# Patient Record
Sex: MALE | Race: WHITE | NOT HISPANIC OR LATINO | Employment: FULL TIME | URBAN - NONMETROPOLITAN AREA
[De-identification: names, ages, dates, MRNs, and addresses within clinical notes are randomized per-mention and may not be internally consistent; named-entity substitution may affect disease eponyms.]

---

## 2018-06-24 ENCOUNTER — OCCUPATIONAL MEDICINE (OUTPATIENT)
Dept: URGENT CARE | Facility: PHYSICIAN GROUP | Age: 58
End: 2018-06-24
Payer: COMMERCIAL

## 2018-06-24 VITALS
OXYGEN SATURATION: 95 % | SYSTOLIC BLOOD PRESSURE: 140 MMHG | HEART RATE: 90 BPM | RESPIRATION RATE: 14 BRPM | DIASTOLIC BLOOD PRESSURE: 90 MMHG | TEMPERATURE: 98.3 F | WEIGHT: 313 LBS

## 2018-06-24 DIAGNOSIS — S83.92XA SPRAIN OF LEFT KNEE, UNSPECIFIED LIGAMENT, INITIAL ENCOUNTER: ICD-10-CM

## 2018-06-24 PROCEDURE — 99214 OFFICE O/P EST MOD 30 MIN: CPT | Performed by: FAMILY MEDICINE

## 2018-06-24 ASSESSMENT — ENCOUNTER SYMPTOMS
FEVER: 0
FOCAL WEAKNESS: 0
DIZZINESS: 0

## 2018-06-24 NOTE — LETTER
EMPLOYEE’S CLAIM FOR COMPENSATION/ REPORT OF INITIAL TREATMENT  FORM C-4    EMPLOYEE’S CLAIM - PROVIDE ALL INFORMATION REQUESTED   First Name  Gildardo Last Name  Kirit Birthdate                    1960                Sex  male Claim Number   Home Address  [address] Age  57 y.o. 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  Zip  [address]    Date of Injury  6/22/2018               Hour of Injury  9:30 PM Date Employer Notified  6/22/2018 Last Day of Work after Injury or Occupational Disease  6/22/2018 Supervisor to Whom Injury Reported  Irina   Address or Location of Accident (if applicable)  [[address]]   What were you doing at the time of accident? (if applicable)  Lifting bucket of rosa sealant    How did this injury or occupational disease occur? (Be specific an answer in detail. Use additional sheet if necessary)  I was Lifting 50lbs buckets (11) of rosa sealant and I felt my left knee pop   If you believe that you have an occupational disease, when did you first have knowledge of the disability and it relationship to your employment?  no Witnesses to the Accident  no      Nature of Injury or Occupational Disease  Workers' Compensation  Part(s) of Body Injured or Affected  Knee (L), ,     I certify that the above is true and correct to the best of my knowledge and that I have provided this information in order to obtain the benefits of [facility] Industrial Insurance and  Occupational Diseases Acts [place] inclusive [place] 617 [place]).  I hereby authorize any physician, chiropractor, surgeon, practitioner, or other person, any hospital, including [facility], any medical service organization, any insurance company, or other institution or organization to release to each other, any medical or other information, including benefits paid or payable, pertinent to this injury or disease, except information relative to diagnosis, treatment and/or counseling for AIDS, psychological conditions, alcohol or controlled substances, for which I must give specific authorization.  A Photostat of this authorization shall be as valid as the original.     Date 06/24/2018   [facility]   Employee’s Signature   THIS REPORT MUST BE COMPLETED AND MAILED WITHIN 3 WORKING DAYS OF TREATMENT   [facility]  Name of Facility  [place]   Date  6/24/2018 Diagnosis  (S83.92XA) Sprain of left knee, unspecified ligament, initial encounter Is there evidence the injured employee was under the influence of alcohol and/or another controlled substance at the time of accident?   Hour  1:51 PM Description of Injury or Disease  The encounter diagnosis was Sprain of left knee, unspecified ligament, initial encounter. No   Treatment  Work restrictions   RICE  OTC Motrin and tylenol   Have you advised the patient to remain off work five days or more? No   X-Ray Findings      If Yes   From Date  To Date      From information given by the employee, together with medical evidence, can you directly connect this injury or occupational disease as job incurred?  Yes If No Full Duty  No Modified Duty  Yes   Is additional medical care by a physician indicated?  Yes If Modified Duty, Specify any Limitations / Restrictions  Sedentary duty mainly. Limit standing/walking to 2hrs max per shift    Do you know of any previous injury or disease contributing  "to this condition or occupational disease?                            No   Date  6/24/2018 Print Doctor’s Name Bhupnedra Flores M.D. I certify the employer’s copy of  this form was mailed on:   Address  [address] Insurer’s Use Only     [place] Zip  [address]    Provider’s Tax ID Number  [id number] Telephone  Dept: [phone]        e-SignBHUPENDRA FLORES M.D.   e-Signature: Dr. Pavan Jackson, Medical Director Degree  MD        ORIGINAL-TREATING PHYSICIAN OR CHIROPRACTOR    PAGE 2-INSURER/TPA    PAGE 3-EMPLOYER    PAGE 4-EMPLOYEE             Form C-4 (rev10/07)              BRIEF DESCRIPTION OF RIGHTS AND BENEFITS  (Pursuant to [place]RS 616C.050)    Notice of Injury or Occupational Disease (Incident Report Form C-1): If an injury or occupational disease (OD) arises out of and in the  course of employment, you must provide written notice to your employer as soon as practicable, but no later than 7 days after the accident or  OD. Your employer shall maintain a sufficient supply of the required forms.    Claim for Compensation (Form C-4): If medical treatment is sought, the form C-4 is available at the place of initial treatment. A completed  \"Claim for Compensation\" (Form C-4) must be filed within 90 days after an accident or OD. The treating physician or chiropractor must,  within 3 working days after treatment, complete and mail to the employer, the employer's insurer and third-party , the Claim for  Compensation.    Medical Treatment: If you require medical treatment for your on-the-job injury or OD, you may be required to select a physician or  chiropractor from a list provided by your workers’ compensation insurer, if it has contracted with an Organization for Managed Care (MCO) or  Preferred Provider Organization (PPO) or providers of health care. If your employer has not entered into a contract with an MCO or PPO, you  may select a physician or chiropractor from the " Panel of Physicians and Chiropractors. Any medical costs related to your industrial injury or  OD will be paid by your insurer.    Temporary Total Disability (TTD): If your doctor has certified that you are unable to work for a period of at least 5 consecutive days, or 5  cumulative days in a 20-day period, or places restrictions on you that your employer does not accommodate, you may be entitled to TTD  compensation.    Temporary Partial Disability (TPD): If the wage you receive upon reemployment is less than the compensation for TTD to which you are  entitled, the insurer may be required to pay you TPD compensation to make up the difference. TPD can only be paid for a maximum of 24  months.    Permanent Partial Disability (PPD): When your medical condition is stable and there is an indication of a PPD as a result of your injury or  OD, within 30 days, your insurer must arrange for an evaluation by a rating physician or chiropractor to determine the degree of your PPD. The  amount of your PPD award depends on the date of injury, the results of the PPD evaluation and your age and wage.    Permanent Total Disability (PTD): If you are medically certified by a treating physician or chiropractor as permanently and totally disabled  and have been granted a PTD status by your insurer, you are entitled to receive monthly benefits not to exceed 66 2/3% of your average  monthly wage. The amount of your PTD payments is subject to reduction if you previously received a PPD award.    Vocational Rehabilitation Services: You may be eligible for vocational rehabilitation services if you are unable to return to the job due to a  permanent physical impairment or permanent restrictions as a result of your injury or occupational disease.    Transportation and Per [place] Reimbursement: You may be eligible for travel expenses and per leola associated with medical treatment.    Reopening: You may be able to reopen your claim if your  condition worsens after claim closure.    Appeal Process: If you disagree with a written determination issued by the insurer or the insurer does not respond to your request, you may  appeal to the Department of Administration, , by following the instructions contained in your determination letter. You must  appeal the determination within 70 days from the date of the determination letter at [address], [address], [place], [place]  [address], or [address], [place], [place] [address]. If you disagree with the  decision, you may appeal to the  Department of Administration, . You must file your appeal within 30 days from the date of the  decision  letter at [address], [address], [place], [place] [address], or [address] [facility] [address], [place], [place] [address]. If you  disagree with a decision of an , you may file a petition for judicial review with the District Court. You must do so within 30  days of the Appeal Officer’s decision. You may be represented by an  at your own expense or you may contact the [facility] for possible  representation.    [place]  for Injured Workers (NAIW): If you disagree with a  decision, you may request that NAIW represent you  without charge at an  Hearing. For information regarding denial of benefits, you may contact the [facility] at: [address], [address], [place] [address], [phone], or [address], [place] [address], [phone]    To File a Complaint with the Division: If you wish to file a complaint with the  of the Division of Industrial Relations (DIR),  please contact the Workers’ Compensation Section, [address], [address], [place], [place] [address], telephone [phone], or  [address], [place], [place] [address], telephone  [phone].    For assistance with Workers’ Compensation Issues: you may contact the Office of the Governor Consumer Health Assistance, [address] [place] [address], [address], [place] [address], Toll Free 1-745.380.9745, Web site: http://[employer].[email or web address].nv., E-mail  [email or web address].                                                                                                                                                                                                                                   __________________________________________________________________                                                                   _06/24/2018________________                Employee Name / Signature                                                                                                                                                       Date                                                                                                                                                                                                     D-2 (rev. 10/07)

## 2018-06-24 NOTE — PROGRESS NOTES
Subjective:      Gildardo Beth is a 57 y.o. male who presents with Knee Injury (WC New, Pt states he cannot put his full weight onto his knee)      DOI 6/22/18, MANN-> picking something up bucket and pivoting at same time and felt something pop Lt knee.       HPI    Review of Systems   Constitutional: Negative for fever.   Neurological: Negative for dizziness and focal weakness.          Objective:     /90   Pulse 90   Temp 36.8 °C (98.3 °F)   Resp 14   Wt (!) 142 kg (313 lb)   SpO2 95%      Physical Exam   Constitutional: He appears well-developed. No distress.   HENT:   Head: Normocephalic and atraumatic.   Cardiovascular: Regular rhythm.    No murmur heard.  Pulmonary/Chest: Effort normal. No respiratory distress.   Neurological: He is alert. He exhibits normal muscle tone.   Skin: Skin is warm and dry.   Psychiatric: He has a normal mood and affect. Judgment normal.   Nursing note and vitals reviewed.      Lt knee: + effusion trace, +TTP lower pole patella, good srom        Assessment/Plan:         1. Sprain of left knee, unspecified ligament, initial encounter         - sedentary duty w/ no standing/walking more then 2hrs per shift   - RICE  - OTC Motrin and tylenol

## 2018-06-24 NOTE — LETTER
[address] [place] [address]  Phone:  [phone] - Fax:  [phone]   [facility] Progress Report and Disability Certification  Date of Service: 6/24/2018   No Show:  No  Date / Time of Next Visit: 7/1/2018   Claim Information   Patient Name: Gildardo Beth  Claim Number:     Employer: [employer]  Date of Injury: 6/22/2018     Insurer / TPA: ID / SSN:     Occupation: Customer service  Diagnosis: The encounter diagnosis was Sprain of left knee, unspecified ligament, initial encounter.    Medical Information   Related to Industrial Injury? Yes    Subjective Complaints:  DOI 6/22/18, MANN-> picking something up bucket and pivoting at same time and felt something pop Lt knee.     Objective Findings: Lt knee: + effusion trace, +TTP lower pole patella, good srom    Pre-Existing Condition(s):     Assessment:   Initial Visit    Status: Additional Care Required  Permanent Disability:No    Plan:      Diagnostics:      Comments:       Disability Information   Status: Released to Restricted Duty    From:  6/24/2018  Through: 7/1/2018 Restrictions are: Temporary   Physical Restrictions   Sitting:    Standing:    Stooping:    Bending:      Squatting:    Walking:    Climbing:    Pushing:      Pulling:    Other:    Reaching Above Shoulder (L):   Reaching Above Shoulder (R):       Reaching Below Shoulder (L):    Reaching Below Shoulder (R):      Not to exceed Weight Limits   Carrying(hrs):   Weight Limit(lb):   Lifting(hrs):   Weight  Limit(lb):     Comments: Mainly sedentary duty, limit standing/walking to 2hrs or less per shift     Repetitive Actions   Hands: i.e. Fine Manipulations from Grasping:     Feet: i.e. Operating Foot Controls:     Driving / Operate Machinery:     Physician Name: Bhupendra Renteria M.D. Physician Signature: BHUPENDRA Green M.D. e-Signature: Dr. Pavan Jackson, Medical Director   Clinic Name / Location:  [address]  [place] [address] Clinic Phone Number: Dept: [phone]   Appointment Time: 12:55 Pm Visit Start Time: 1:51 PM   Check-In Time:  1:01 Pm Visit Discharge Time: 2:03 Pm    Original-Treating Physician or Chiropractor    Page 2-Insurer/TPA    Page 3-Employer    Page 4-Employee

## 2018-07-01 ENCOUNTER — OCCUPATIONAL MEDICINE (OUTPATIENT)
Dept: URGENT CARE | Facility: PHYSICIAN GROUP | Age: 58
End: 2018-07-01
Payer: COMMERCIAL

## 2018-07-01 VITALS
BODY MASS INDEX: 42.39 KG/M2 | HEIGHT: 72 IN | DIASTOLIC BLOOD PRESSURE: 86 MMHG | WEIGHT: 313 LBS | SYSTOLIC BLOOD PRESSURE: 144 MMHG | RESPIRATION RATE: 14 BRPM | TEMPERATURE: 97.8 F | OXYGEN SATURATION: 97 % | HEART RATE: 82 BPM

## 2018-07-01 DIAGNOSIS — S83.92XD SPRAIN OF LEFT KNEE, UNSPECIFIED LIGAMENT, SUBSEQUENT ENCOUNTER: ICD-10-CM

## 2018-07-01 PROCEDURE — 99214 OFFICE O/P EST MOD 30 MIN: CPT | Mod: 29 | Performed by: PHYSICIAN ASSISTANT

## 2018-07-01 ASSESSMENT — PAIN SCALES - GENERAL: PAINLEVEL: 5=MODERATE PAIN

## 2018-07-01 NOTE — PROGRESS NOTES
"Chief Complaint   Patient presents with   • Knee Pain     LT knee pain WC       HISTORY OF PRESENT ILLNESS: Patient is a 57 y.o. male who presents today for the following:    DOI: 6/22/18  Was picking up a bucket of rosa material; felt a pop in left knee while pivoting, immediate pain  Able to put weight on LLE when bent but unable to when it's straight  Feels a little better today than previous visit from a pain perspective continues to feel as though it is going to give out with any weightbearing   Feels swollen, sharp pain through the knee cap   OTC: ice, ibuprofen - helps a little until he bears any weight on iyt  Other places of employment: [employer] (sitting job)  H/o left knee issues: none     There are no active problems to display for this patient.      Allergies:Patient has no known allergies.    No current Original-ordered outpatient prescriptions on file.     No current Original-ordered facility-administered medications on file.        History reviewed. No pertinent past medical history.    Social History   Substance Use Topics   • Smoking status: Current Every Day Smoker   • Smokeless tobacco: Never Used   • Alcohol use No       No family status information on file.   History reviewed. No pertinent family history.    Review of Systems:   Constitutional ROS: No unexpected change in weight, No weakness, No fatigue  Pulmonary ROS: No chronic cough, sputum, or hemoptysis, No dyspnea on exertion, No wheezing  Hematologic/Lymphatic ROS: No chills, No night sweats, No weight loss  Skin/Integumentary ROS: No edema, No evidence of rash, No itching      Exam:  Blood pressure 144/86, pulse 82, temperature 36.6 °C (97.8 °F), resp. rate 14, height 1.816 m (5' 11.5\"), weight (!) 142 kg (313 lb), SpO2 97 %.  General: Well developed, well nourished. No distress.  Pulmonary: Unlabored respiratory effort.    Extremities: No obvious soft tissue swelling/effusion noted of the left knee. Tenderness noted in the medial joint " line extending into the medial aspect of the knee. Significant pain with valgus stress. Negative anterior/posterior drawer. No instability noted. Able to fully extend. Slight decreased flexion.  Skin: Warm, dry, good turgor. No rashes in visible areas.   Psych: Normal mood. Alert and oriented x3. Judgment and insight is normal.    Assessment/Plan:  Return to clinic in one month, when physical therapy is completed, or for any significant changes in symptoms, whichever comes first. See D39 for restrictions. Discussed dosing of ibuprofen and acetaminophen as needed for pain, not exceeding dosages as recommended on the bottles. Recommended over-the-counter neoprene-type knee brace, ice, heat, and muscle rubs.  1. Sprain of left knee, unspecified ligament, subsequent encounter  REFERRAL TO PHYSICAL THERAPY Reason for Therapy: Eval/Treat/Report

## 2018-07-01 NOTE — LETTER
"   [facility] [place]  [address] [place] [address] [place] [address]  Phone:  [phone] - Fax:  [phone]   [facility] Progress Report and Disability Certification  Date of Service: 7/1/2018   No Show:  No  Date / Time of Next Visit: 7/29/2018 @ 9am   Claim Information   Patient Name: Gildardo Beth  Claim Number:     Employer: [employer]  Date of Injury: 6/22/2018     Insurer / TPA: [employer]  ID / SSN:     Occupation: Customer service  Diagnosis: The encounter diagnosis was Sprain of left knee, unspecified ligament, subsequent encounter.    Medical Information   Related to Industrial Injury? Yes    Subjective Complaints:  DOI: 6/22/18  Was picking up a bucket of rosa material; felt a pop in left knee while pivoting, immediate pain  Able to put weight on LLE when bent but unable to when it's straight  Feels a little better today than previous visit from a pain perspective continues to feel as though it is going to give out with any weightbearing   Feels swollen, sharp pain through the knee cap   OTC: ice, ibuprofen - helps a little until he bears any weight on iyt  Other places of employment: [employer] (sitting job)  H/o left knee issues: none    Objective Findings: Blood pressure 144/86, pulse 82, temperature 36.6 °C (97.8 °F), resp. rate 14, height 1.816 m (5' 11.5\"), weight (!) 142 kg (313 lb), SpO2 97 %.  General: Well developed, well nourished. No distress.  Pulmonary: Unlabored respiratory effort.    Extremities: No obvious soft tissue swelling/effusion noted of the left knee. Tenderness noted in the medial joint line extending into the medial aspect of the knee. Significant pain with valgus stress. Negative anterior/posterior drawer. No instability noted. Able to fully extend. Slight decreased flexion.  Skin: Warm, dry, good turgor. No rashes in visible areas.   Psych: Normal mood. Alert and oriented x3. Judgment and insight is normal.   "   Pre-Existing Condition(s):     Assessment:   Condition Same    Status: Additional Care Required  Permanent Disability:No    Plan: Medication  Comments:OTC only    Diagnostics:      Comments:  Assessment/Plan:  Return to clinic in one month, when physical therapy is completed, or for any significant changes in symptoms, whichever comes first. See D39 for restrictions. Discussed dosing of ibuprofen and acetaminophen as needed for pain, not   exceeding dosages as recommended on the bottles. Recommended over-the-counter neoprene-type knee brace, ice, heat, and muscle rubs.  1. Sprain of left knee, unspecified ligament, subsequent encounter  REFERRAL TO PHYSICAL THERAPY Reason for Therapy:   Eval/Treat/Report    Disability Information   Status: Released to Restricted Duty    From:  2018  Through: 2018 Restrictions are: Temporary   Physical Restrictions   Sitting:    Standing:  < or = to 2 hrs/day Stoopin hrs/day Bendin hrs/day   Squattin hrs/day Walking:  < or = to 2 hrs/day Climbin hrs/day Pushing:      Pulling:    Other:    Reaching Above Shoulder (L):   Reaching Above Shoulder (R):       Reaching Below Shoulder (L):    Reaching Below Shoulder (R):      Not to exceed Weight Limits   Carrying(hrs):   Weight Limit(lb): < or = to 10 pounds Lifting(hrs):   Weight  Limit(lb): < or = to 10 pounds   Comments: 2 hour maximum per shift standing/walking combined. Must be allowed to sit down every 10 minutes as needed for left knee pain.    Repetitive Actions   Hands: i.e. Fine Manipulations from Grasping:     Feet: i.e. Operating Foot Controls:     Driving / Operate Machinery:     Physician Name: Bekah Duncan P.A.-C. Physician Signature: BEKAH Cordero P.A.-C. e-Signature: Dr. Pavan Jackson, Medical Director   Clinic Name / Location: [address] [address] Clinic Phone Number: Dept: [phone]   Appointment Time: 9:00 Am Visit  Start Time: 9:12 AM   Check-In Time:  9:03 Am Visit Discharge Time: 10:07 AM   Original-Treating Physician or Chiropractor    Page 2-Insurer/TPA    Page 3-Employer    Page 4-Employee

## 2018-08-08 ENCOUNTER — OCCUPATIONAL MEDICINE (OUTPATIENT)
Dept: URGENT CARE | Facility: PHYSICIAN GROUP | Age: 58
End: 2018-08-08
Payer: COMMERCIAL

## 2018-08-08 VITALS
BODY MASS INDEX: 42.66 KG/M2 | RESPIRATION RATE: 16 BRPM | OXYGEN SATURATION: 94 % | HEIGHT: 72 IN | TEMPERATURE: 98 F | WEIGHT: 315 LBS | HEART RATE: 64 BPM | DIASTOLIC BLOOD PRESSURE: 84 MMHG | SYSTOLIC BLOOD PRESSURE: 142 MMHG

## 2018-08-08 DIAGNOSIS — S83.92XS SPRAIN OF LEFT KNEE, UNSPECIFIED LIGAMENT, SEQUELA: ICD-10-CM

## 2018-08-08 PROCEDURE — 99214 OFFICE O/P EST MOD 30 MIN: CPT | Mod: 29 | Performed by: PHYSICIAN ASSISTANT

## 2018-08-08 ASSESSMENT — ENCOUNTER SYMPTOMS
FALLS: 0
DIARRHEA: 0
BACK PAIN: 0
TINGLING: 0
ABDOMINAL PAIN: 0
FOCAL WEAKNESS: 0
EYE REDNESS: 0
FEVER: 0
CHILLS: 0
EYE DISCHARGE: 0

## 2018-08-08 ASSESSMENT — PAIN SCALES - GENERAL: PAINLEVEL: 2=MINIMAL-SLIGHT

## 2018-08-08 NOTE — LETTER
[address] [place] [address]  Phone:  [phone] - Fax:  [phone]   [facility] Progress Report and Disability Certification  Date of Service: 8/8/2018   No Show:  No  Date / Time of Next Visit: 8/15/2018   Claim Information   Patient Name: Gildardo Beth  Claim Number:     Employer: [employer]  Date of Injury: 6/22/2018     Insurer / TPA: [employer]  ID / SSN:     Occupation: Customer service  Diagnosis: The encounter diagnosis was Sprain of left knee, unspecified ligament, sequela.    Medical Information   Related to Industrial Injury? Yes    Subjective Complaints:  DOI: 6/22/18- Visit #3-   Was picking up a bucket of rosa material; felt a pop in left knee while pivoting, immediate pain  Pt has been to 6 PT sessions- reports soreness the first week, significant improvement the second week, and now with soreness.   Feels a little better today than previous visit from a pain perspective along with mobility- continues to feel as though it is going to give out with some weightbearing.   Feels swollen, sharp pain through the knee cap   OTC: ice, ibuprofen - helps a little until he bears any weight, however does fine with walking up stairs.   Other places of employment: [employer] (sitting job) Has not been working at [employer].   H/o left knee issues: none    Objective Findings: Extremities: No obvious soft tissue swelling/effusion noted of the left knee. Minimal tenderness along the medial and anterior portion of the knee .  Pain with valgus stress. Negative anterior/posterior drawer. No instability noted. Able to fully extend. Slight decreased flexion.   Pre-Existing Condition(s):     Assessment:        Status: Additional Care Required  Permanent Disability:No    Plan:      Diagnostics:      Comments:       Disability Information   Status: Released to Restricted Duty    From:  8/8/2018  Through: 8/15/2018  Restrictions are: Temporary   Physical Restrictions   Sitting:  < or = to 2 hrs/day Standing:  < or = to 2 hrs/day Stoopin hrs/day Bending:      Squatting:    Walking:    Climbin hrs/day Pushing:      Pulling:    Other:    Reaching Above Shoulder (L):   Reaching Above Shoulder (R):       Reaching Below Shoulder (L):    Reaching Below Shoulder (R):      Not to exceed Weight Limits   Carrying(hrs):   Weight Limit(lb): < or = to 10 pounds Lifting(hrs):   Weight  Limit(lb): < or = to 10 pounds   Comments: Transferred to [facility]- for further evaluation. Standing, sitting- ect. As tolerated per patient. Continue with Ice, OTC NSAIDS if needed. F/U with [facility] in the next week for further recheck and evaluation.       Repetitive Actions   Hands: i.e. Fine Manipulations from Grasping:     Feet: i.e. Operating Foot Controls:     Driving / Operate Machinery:     Physician Name: Jian Manzo P.A.-C. Physician Signature: JIAN Jean Baptiste P.A.-C. e-Signature: Dr. Pavan Jackson, Medical Director   Clinic Name / Location: [address] [address] Clinic Phone Number: Dept: [phone]   Appointment Time: 9:00 Am Visit Start Time: 9:05 AM   Check-In Time:  8:59 Am Visit Discharge Time:  9:54 am   Original-Treating Physician or Chiropractor    Page 2-Insurer/TPA    Page 3-Employer    Page 4-Employee

## 2018-08-08 NOTE — PROGRESS NOTES
Subjective:      Gildardo Beth is a 57 y.o. male who presents with Follow-Up (knee injury 6/24 )          HPI  DOI: 6/22/18- Visit #3-   Was picking up a bucket of rosa material; felt a pop in left knee while pivoting, immediate pain  Pt has been to 6 PT sessions- reports soreness the first week, significant improvement the second week, and now with soreness.   Feels a little better today than previous visit from a pain perspective along with mobility- continues to feel as though it is going to give out with some weightbearing.   Feels swollen, sharp pain through the knee cap   OTC: ice, ibuprofen - helps a little until he bears any weight, however does fine with walking up stairs.   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   H/o left knee issues: none       Review of Systems   Constitutional: Negative for chills and fever.   Eyes: Negative for discharge and redness.   Gastrointestinal: Negative for abdominal pain and diarrhea.   Musculoskeletal: Positive for joint pain. Negative for back pain and falls.   Neurological: Negative for tingling and focal weakness.   All other systems reviewed and are negative.         Objective:     /84   Pulse 64   Temp 36.7 °C (98 °F)   Resp 16   Ht 1.829 m (6')   Wt (!) 142.9 kg (315 lb)   SpO2 94%   BMI 42.72 kg/m²    PMH:  has no past medical history on file.  MEDS: No current outpatient prescriptions on file.  ALLERGIES: No Known Allergies  SURGHX: No past surgical history on file.  SOCHX:  reports that he has been smoking.  He has never used smokeless tobacco. He reports that he does not drink alcohol or use drugs.  FH: Family history was reviewed, no pertinent findings to report    Physical Exam   Constitutional: He is oriented to person, place, and time. He appears well-developed and well-nourished.   HENT:   Head: Normocephalic and atraumatic.   Eyes: Pupils are equal, round, and reactive to light. EOM are  normal.   Neck: Normal range of motion. Neck supple.   Cardiovascular: Normal rate and regular rhythm.    Pulmonary/Chest: Breath sounds normal. No respiratory distress.   Neurological: He is alert and oriented to person, place, and time.   Psychiatric: He has a normal mood and affect. His behavior is normal. Thought content normal.   Vitals reviewed.      Extremities: No obvious soft tissue swelling/effusion noted of the left knee. Minimal tenderness along the medial and anterior portion of the knee .  Pain with valgus stress. Negative anterior/posterior drawer. No instability noted. Able to fully extend. Slight decreased flexion.  Noted scattered venous stasis changes to the left ankle and shin.      Assessment/Plan:     1. Sprain of left knee, unspecified ligament, sequela    Transferred to [facility]- due to visit #3- and pt. Is several weeks out from original injury. Continue with RICE, NSAIDs if needed- home PT exercises.   Please see D39 for restrictions.   Patient given precautionary s/sx that mandate immediate follow up and evaluation in the ED. Advised of risks of not doing so.    DDX, Supportive care, and indications for immediate follow-up discussed with patient.    Instructed to return to clinic or nearest emergency department if we are not available for any change in condition, further concerns, or worsening of symptoms.    The patient demonstrated a good understanding and agreed with the treatment plan.  Please note that this dictation was created using voice recognition software. I have made every reasonable attempt to correct obvious errors, but I expect that there are errors of grammar and possibly content that I did not discover before finalizing the note.

## 2018-08-15 ENCOUNTER — APPOINTMENT (OUTPATIENT)
Dept: RADIOLOGY | Facility: IMAGING CENTER | Age: 58
End: 2018-08-15
Attending: PREVENTIVE MEDICINE
Payer: COMMERCIAL

## 2018-08-15 ENCOUNTER — OCCUPATIONAL MEDICINE (OUTPATIENT)
Dept: OCCUPATIONAL MEDICINE | Facility: CLINIC | Age: 58
End: 2018-08-15
Payer: COMMERCIAL

## 2018-08-15 VITALS
DIASTOLIC BLOOD PRESSURE: 98 MMHG | OXYGEN SATURATION: 100 % | SYSTOLIC BLOOD PRESSURE: 144 MMHG | WEIGHT: 315 LBS | BODY MASS INDEX: 42.66 KG/M2 | HEIGHT: 72 IN | HEART RATE: 68 BPM | TEMPERATURE: 98.6 F

## 2018-08-15 DIAGNOSIS — S83.92XD SPRAIN OF LEFT KNEE, UNSPECIFIED LIGAMENT, SUBSEQUENT ENCOUNTER: ICD-10-CM

## 2018-08-15 PROCEDURE — 73562 X-RAY EXAM OF KNEE 3: CPT | Mod: TC,LT,29 | Performed by: NURSE PRACTITIONER

## 2018-08-15 PROCEDURE — 99204 OFFICE O/P NEW MOD 45 MIN: CPT | Performed by: PREVENTIVE MEDICINE

## 2018-08-15 RX ORDER — PREDNISONE 20 MG/1
20 TABLET ORAL 2 TIMES DAILY
Qty: 14 TAB | Refills: 0 | Status: SHIPPED | OUTPATIENT
Start: 2018-08-15

## 2018-08-15 RX ORDER — DICLOFENAC SODIUM 75 MG/1
75 TABLET, DELAYED RELEASE ORAL 2 TIMES DAILY
Qty: 60 TAB | Refills: 0 | Status: SHIPPED | OUTPATIENT
Start: 2018-08-15

## 2018-08-15 NOTE — PROGRESS NOTES
"Subjective:      Gildardo Beth is a 58 y.o. male who presents with Knee Injury (WC DOI 6/22/18 Left knee-worse RM 24)      Date of injury 6/22/2018.  Mechanism of injury- \"lifting 50 pound buckets of rosa sealant and I felt my left knee pop\".  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  He has been seen in urgent care for 3 visits.  He continues to have persistent symptoms.  He was improving but then began to worsen.  He states he feels like the knee is giving way and instability.  He says he felt and heard a pop at the time of injury.  No locking.  No previous knee injury.     HPI    ROS  Comprehensive medical history form reviewed. Pertinent positives and negatives included in HPI.    PFSH: reviewed in Epic    PMH:  has no past medical history on file.  MEDS:   Current Outpatient Prescriptions:   •  diclofenac EC (VOLTAREN) 75 MG Tablet Delayed Response, Take 1 Tab by mouth 2 times a day., Disp: 60 Tab, Rfl: 0  •  predniSONE (DELTASONE) 20 MG Tab, Take 1 Tab by mouth 2 times a day., Disp: 14 Tab, Rfl: 0  ALLERGIES: No Known Allergies  SURGHX: History reviewed. No pertinent surgical history.  SOCHX:  reports that he has been smoking.  He has never used smokeless tobacco. He reports that he does not drink alcohol or use drugs.  Work Status:  with [email or web address]-not working currently  FH: No pertinent hereditary disorders.        Objective:     /98   Pulse 68   Temp 37 °C (98.6 °F)   Ht 1.829 m (6')   Wt (!) 144.2 kg (318 lb)   SpO2 100%   BMI 43.13 kg/m²      Physical Exam    Appearance: Well-developed, well-nourished.   Mental Status: Mood and Affect normal. Pleasant. Cooperative. Appropriate.   ENT: Oropharynx clear. Moist mucous membranes. Hearing normal.   Eyes: Pupils reactive. Conjunctiva normal. No scleral icterus.   Neck: Trachea Midline. No thyromegaly. No masses.  Cardiovascular: Normal rate. Regular rhythm. Normal heart sounds.   Chest: Effort normal. " Breath sounds clear.   Skin: Skin is warm and dry.  Rash left lower extremity of uncertain significance (present for years)  Musculoskeletal: Left knee shows no swelling, ecchymosis, heat.  Pain localized to the anterior medial knee without joint line tenderness.  Distal neurovascular intact.         Assessment/Plan:     1. Sprain of left knee, unspecified ligament, subsequent encounter  New to occupational health from urgent care  Condition unimproved  - MR-KNEE-W/O LEFT; Future  - REFERRAL TO RADIOLOGY  - diclofenac EC (VOLTAREN) 75 MG Tablet Delayed Response; Take 1 Tab by mouth 2 times a day.  Dispense: 60 Tab; Refill: 0  - predniSONE (DELTASONE) 20 MG Tab; Take 1 Tab by mouth 2 times a day.  Dispense: 14 Tab; Refill: 0  - DX-KNEE 3 VIEWS LEFT; Future  Restricted activity  Recheck in 1 month or sooner if MRI accomplished  Anticipate orthopedic consultation

## 2018-08-15 NOTE — LETTER
"[address],   [address] [place] [address]  Phone:  [phone] - Fax:  [phone]   [facility] Progress Report and Disability Certification  Date of Service: 8/15/2018   No Show:  No  Date / Time of Next Visit: 9/13/2018   Claim Information   Patient Name: Gildardo Beth  Claim Number:     Employer: [employer] *** Date of Injury: 6/22/2018     Insurer / TPA: [employer] *** ID / SSN:     Occupation: Customer service *** Diagnosis: The encounter diagnosis was Sprain of left knee, unspecified ligament, subsequent encounter.    Medical Information   Related to Industrial Injury? Yes ***   Subjective Complaints:  Date of injury 6/22/2018.  Mechanism of injury- \"lifting 50 pound buckets of rosa sealant and I felt my left knee pop\".  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  He has been seen in urgent care for 3 visits.  He continues to have persistent symptoms.  He was improving but then began to worsen.  He states he feels like the knee is giving way and instability.  He says he felt and heard a pop at the time of injury.  No locking.  No previous knee injury.   Objective Findings: Appearance: Well-developed, well-nourished.   Mental Status: Mood and Affect normal. Pleasant. Cooperative. Appropriate.   ENT: Oropharynx clear. Moist mucous membranes. Hearing normal.   Eyes: Pupils reactive. Conjunctiva normal. No scleral icterus.   Neck: Trachea Midline. No thyromegaly. No masses.  Cardiovascular: Normal rate. Regular rhythm. Normal heart sounds.   Chest: Effort normal. Breath sounds clear.   Skin: Skin is warm and dry. No rash.   Musculoskeletal: Left knee shows no swelling, ecchymosis, heat.  Pain localized to the anterior medial knee without joint line tenderness.  Distal neurovascular intact.     Pre-Existing Condition(s):     Assessment:   Condition Same    Status: Additional Care Required  Permanent " Disability:No    Plan: Diagnostics    Diagnostics: X-rayMRI    Comments:  MRI requested to rule out internal derangement.  Anticipate orthopedic consultation.    Disability Information   Status: Released to Restricted Duty    From:  8/15/2018  Through: 9/13/2018 Restrictions are: Temporary   Physical Restrictions   Sitting:    Standing:  < or = to 4 hrs/day Stooping:    Bending:      Squatting:    Walking:  < or = to 2 hrs/day Climbing:    Pushing:      Pulling:    Other:    Reaching Above Shoulder (L):   Reaching Above Shoulder (R):       Reaching Below Shoulder (L):    Reaching Below Shoulder (R):      Not to exceed Weight Limits   Carrying(hrs):   Weight Limit(lb):   Lifting(hrs):   Weight  Limit(lb):     Comments:      Repetitive Actions   Hands: i.e. Fine Manipulations from Grasping:     Feet: i.e. Operating Foot Controls:     Driving / Operate Machinery:     Physician Name: Marck Newman M.D. Physician Signature: MARCK Carpenter M.D. e-Signature: Dr. Pavan Jackson, Medical Director   Clinic Name / Location: [address],   [address] [address] Clinic Phone Number: Dept: [phone]   Appointment Time: 4:15 Pm Visit Start Time: 4:03 PM   Check-In Time:  3:52 Pm Visit Discharge Time:  ***   Original-Treating Physician or Chiropractor    Page 2-Insurer/TPA    Page 3-Employer    Page 4-Employee

## 2018-08-15 NOTE — LETTER
"   [address],   [address] [place] [address]  Phone:  [phone] - Fax:  [phone]   [facility] Progress Report and Disability Certification  Date of Service: 8/15/2018   No Show:  No  Date / Time of Next Visit: 9/13/2018@4:45pm   Claim Information   Patient Name: Gildardo Beth  Claim Number:     Employer: [employer]  Date of Injury: 6/22/2018     Insurer / TPA: [employer]  ID / SSN:     Occupation: Customer service  Diagnosis: The encounter diagnosis was Sprain of left knee, unspecified ligament, subsequent encounter.    Medical Information   Related to Industrial Injury? Yes    Subjective Complaints:  Date of injury 6/22/2018.  Mechanism of injury- \"lifting 50 pound buckets of rosa sealant and I felt my left knee pop\".  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  He has been seen in urgent care for 3 visits.  He continues to have persistent symptoms.  He was improving but then began to worsen.  He states he feels like the knee is giving way and instability.  He says he felt and heard a pop at the time of injury.  No locking.  No previous knee injury.   Objective Findings: Appearance: Well-developed, well-nourished.   Mental Status: Mood and Affect normal. Pleasant. Cooperative. Appropriate.   ENT: Oropharynx clear. Moist mucous membranes. Hearing normal.   Eyes: Pupils reactive. Conjunctiva normal. No scleral icterus.   Neck: Trachea Midline. No thyromegaly. No masses.  Cardiovascular: Normal rate. Regular rhythm. Normal heart sounds.   Chest: Effort normal. Breath sounds clear.   Skin: Skin is warm and dry.  Rash left lower extremity of uncertain significance (present for years)  Musculoskeletal: Left knee shows no swelling, ecchymosis, heat.  Pain localized to the anterior medial knee without joint line tenderness.  Distal neurovascular intact.     Pre-Existing Condition(s):     Assessment:   " Condition Same    Status: Additional Care Required  Permanent Disability:No    Plan: Diagnostics    Diagnostics: X-rayMRI    Comments:  MRI requested to rule out internal derangement.  Anticipate orthopedic consultation.    Disability Information   Status: Released to Restricted Duty    From:  8/15/2018  Through: 9/13/2018 Restrictions are: Temporary   Physical Restrictions   Sitting:    Standing:  < or = to 4 hrs/day Stooping:    Bending:      Squatting:    Walking:  < or = to 2 hrs/day Climbing:    Pushing:      Pulling:    Other:    Reaching Above Shoulder (L):   Reaching Above Shoulder (R):       Reaching Below Shoulder (L):    Reaching Below Shoulder (R):      Not to exceed Weight Limits   Carrying(hrs):   Weight Limit(lb):   Lifting(hrs):   Weight  Limit(lb):     Comments:      Repetitive Actions   Hands: i.e. Fine Manipulations from Grasping:     Feet: i.e. Operating Foot Controls:     Driving / Operate Machinery:     Physician Name: Marck Newman M.D. Physician Signature: MARCK Carpenter M.D. e-Signature: Dr. Pavan Jackson, Medical Director   Clinic Name / Location: [address],   [address] [place] [address] Clinic Phone Number: Dept: [phone]   Appointment Time: 4:15 Pm Visit Start Time: 4:03 PM   Check-In Time:  3:52 Pm Visit Discharge Time:  4:51pm   Original-Treating Physician or Chiropractor    Page 2-Insurer/TPA    Page 3-Employer    Page 4-Employee

## 2018-09-07 ENCOUNTER — OCCUPATIONAL MEDICINE (OUTPATIENT)
Dept: OCCUPATIONAL MEDICINE | Facility: CLINIC | Age: 58
End: 2018-09-07
Payer: COMMERCIAL

## 2018-09-07 VITALS
TEMPERATURE: 97.9 F | BODY MASS INDEX: 43.82 KG/M2 | OXYGEN SATURATION: 95 % | WEIGHT: 313 LBS | HEART RATE: 82 BPM | RESPIRATION RATE: 16 BRPM | HEIGHT: 71 IN

## 2018-09-07 DIAGNOSIS — S83.92XD SPRAIN OF LEFT KNEE, UNSPECIFIED LIGAMENT, SUBSEQUENT ENCOUNTER: ICD-10-CM

## 2018-09-07 PROCEDURE — 99213 OFFICE O/P EST LOW 20 MIN: CPT | Performed by: PREVENTIVE MEDICINE

## 2018-09-07 ASSESSMENT — PAIN SCALES - GENERAL: PAINLEVEL: 2=MINIMAL-SLIGHT

## 2018-09-07 NOTE — LETTER
"[address],   [address] [place] [address]  Phone:  [phone] - Fax:  [phone]   [facility] Progress Report and Disability Certification  Date of Service: 9/7/2018   No Show:  No  Date / Time of Next Visit: 10/18/2018 @ 4:45pm   Claim Information   Patient Name: Gildardo Beth  Claim Number:     Employer: [employer]  Date of Injury: 6/22/2018     Insurer / TPA: [employer]  ID / SSN:     Occupation: Customer service  Diagnosis: The encounter diagnosis was Sprain of left knee, unspecified ligament, subsequent encounter.    Medical Information   Related to Industrial Injury? Yes    Subjective Complaints:  Date of injury 6/22/2018.  Mechanism of injury- \"lifting 50 pound buckets of rosa sealant and I felt my left knee pop\".  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  He reports no improvement.     Objective Findings: Musculoskeletal: Left knee shows no swelling, ecchymosis, heat.  Pain localized to the anterior medial knee without joint line tenderness.  Distal neurovascular intact.   Pre-Existing Condition(s):     Assessment:   Condition Same    Status: Discharged / Care Transfer  Permanent Disability:No    Plan: Consultation    Diagnostics:      Comments:  MRI result pending.  Orthopedic consultation requested    Disability Information   Status: Released to Restricted Duty    From:  9/7/2018  Through: 10/18/2018 Restrictions are:     Physical Restrictions   Sitting:    Standing:  < or = to 4 hrs/day Stooping:    Bending:      Squatting:    Walking:  < or = to 2 hrs/day Climbing:    Pushing:      Pulling:    Other:    Reaching Above Shoulder (L):   Reaching Above Shoulder (R):       Reaching Below Shoulder (L):    Reaching Below Shoulder (R):      Not to exceed Weight Limits   Carrying(hrs):   Weight Limit(lb):   Lifting(hrs):   Weight  Limit(lb):     Comments:      Repetitive Actions   Hands: i.e. " Fine Manipulations from Grasping:     Feet: i.e. Operating Foot Controls:     Driving / Operate Machinery:     Physician Name: Marck Newman M.D. Physician Signature: MARCK Carpenter M.D. e-Signature: Dr. Pavan Jackson, Medical Director   Clinic Name / Location: [address],   [address]  [place] [address] Clinic Phone Number: Dept: [phone]   Appointment Time: 4:45 Pm Visit Start Time: 4:35 PM   Check-In Time:  4:31 Pm Visit Discharge Time:  5:14pm   Original-Treating Physician or Chiropractor    Page 2-Insurer/TPA    Page 3-Employer    Page 4-Employee

## 2018-09-08 NOTE — PROGRESS NOTES
"Subjective:      Gildardo Beth is a 58 y.o. male who presents with Follow-Up ((WC DOI 6/22/18 Left knee-same RM 25))      Date of injury 6/22/2018.  Mechanism of injury- \"lifting 50 pound buckets of rosa sealant and I felt my left knee pop\".  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  He reports no improvement.       HPI    ROS  PFSH:  WORK STATUS: Restricted activity  PMH:  has no past medical history on file.  MEDS:   Current Outpatient Prescriptions:   •  diclofenac EC (VOLTAREN) 75 MG Tablet Delayed Response, Take 1 Tab by mouth 2 times a day., Disp: 60 Tab, Rfl: 0  •  predniSONE (DELTASONE) 20 MG Tab, Take 1 Tab by mouth 2 times a day., Disp: 14 Tab, Rfl: 0       Objective:     Pulse 82   Temp 36.6 °C (97.9 °F)   Resp 16   Ht 1.803 m (5' 11\")   Wt (!) 142 kg (313 lb)   SpO2 95%   BMI 43.65 kg/m²      Physical Exam    Musculoskeletal: Left knee shows no swelling, ecchymosis, heat.  Pain localized to the anterior medial knee without joint line tenderness.  Distal neurovascular intact.       Assessment/Plan:     1. Sprain of left knee, unspecified ligament, subsequent encounter  MRI pending  Orthopedic consultation  Restricted activity  Recheck in 6 weeks      "